# Patient Record
Sex: MALE | Race: WHITE
[De-identification: names, ages, dates, MRNs, and addresses within clinical notes are randomized per-mention and may not be internally consistent; named-entity substitution may affect disease eponyms.]

---

## 2022-02-23 ENCOUNTER — APPOINTMENT (OUTPATIENT)
Dept: FAMILY MEDICINE | Facility: CLINIC | Age: 57
End: 2022-02-23
Payer: MEDICAID

## 2022-02-23 ENCOUNTER — NON-APPOINTMENT (OUTPATIENT)
Age: 57
End: 2022-02-23

## 2022-02-23 VITALS
HEIGHT: 67 IN | HEART RATE: 80 BPM | SYSTOLIC BLOOD PRESSURE: 132 MMHG | OXYGEN SATURATION: 99 % | RESPIRATION RATE: 14 BRPM | BODY MASS INDEX: 31.23 KG/M2 | DIASTOLIC BLOOD PRESSURE: 86 MMHG | WEIGHT: 199 LBS | TEMPERATURE: 97.8 F

## 2022-02-23 VITALS — SYSTOLIC BLOOD PRESSURE: 130 MMHG | DIASTOLIC BLOOD PRESSURE: 91 MMHG

## 2022-02-23 DIAGNOSIS — Z00.00 ENCOUNTER FOR GENERAL ADULT MEDICAL EXAMINATION W/OUT ABNORMAL FINDINGS: ICD-10-CM

## 2022-02-23 DIAGNOSIS — Z83.3 FAMILY HISTORY OF DIABETES MELLITUS: ICD-10-CM

## 2022-02-23 DIAGNOSIS — Z80.7 FAMILY HISTORY OF OTHER MALIGNANT NEOPLASMS OF LYMPHOID, HEMATOPOIETIC AND RELATED TISSUES: ICD-10-CM

## 2022-02-23 DIAGNOSIS — R03.0 ELEVATED BLOOD-PRESSURE READING, W/OUT DIAGNOSIS OF HYPERTENSION: ICD-10-CM

## 2022-02-23 DIAGNOSIS — R22.2 LOCALIZED SWELLING, MASS AND LUMP, TRUNK: ICD-10-CM

## 2022-02-23 DIAGNOSIS — M79.643 PAIN IN UNSPECIFIED HAND: ICD-10-CM

## 2022-02-23 PROCEDURE — 99214 OFFICE O/P EST MOD 30 MIN: CPT | Mod: 25

## 2022-02-23 PROCEDURE — 99386 PREV VISIT NEW AGE 40-64: CPT | Mod: 25

## 2022-02-23 PROCEDURE — 93000 ELECTROCARDIOGRAM COMPLETE: CPT

## 2022-02-23 NOTE — HISTORY OF PRESENT ILLNESS
[FreeTextEntry1] : Patient is here for CPE [de-identified] : 56y M w/ no PMhx presenting to establish care/CPE.concerned w/ a skin lesion and wrist pain.\par \par wrist pain x 2 weeks. happened about 8 months ago and then the pain returned 2 weeks ago. 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 it feels swollen  he was wearing a brace he bought from [email or web address] which helps. feels better with applying ice. denies numbness or tingling of hands. denies decreased strength. gets better with rest. it improved on its own previously. he is a drummer and also plays the guitar. playing also triggers the pain. \par \par he has noticed a skin bump in his middle upper back. he has had it for 21 years. he has not noticed it getting bigger but it bothers him. not painful and doesn’t bleed.

## 2022-02-23 NOTE — HEALTH RISK ASSESSMENT
[0] : 2) Feeling down, depressed, or hopeless: Not at all (0) [PHQ-2 Negative - No further assessment needed] : PHQ-2 Negative - No further assessment needed [Current] : Current [10-14] : 10-14 [2 - 4 times a month (2 pts)] : 2-4 times a month (2 points) [1 or 2 (0 pts)] : 1 or 2 (0 points) [Never (0 pts)] : Never (0 points) [Yes] : In the past 12 months have you used drugs other than those required for medical reasons? Yes [HIV Test offered] : HIV Test offered [Hepatitis C test offered] : Hepatitis C test offered [Employed] : employed [] :  [de-identified] : started smoking at 41 yo.  he smokes a pack a day but very recently he is trying to reduce the amount.  [Audit-CScore] : 2 [de-identified] : marijuana occasionally [QTC4Iyixg] : 0 [FreeTextEntry2] : [place]

## 2022-02-23 NOTE — ASSESSMENT
[FreeTextEntry1] : Physical Exam:\par Constitutional: No acute distress, well appearing\par HEENT: Normocephalic, atraumatic\par Neck: supple\par Cardiac: S1S2, Regular rate and rhythm, No murmurs\par Pulmonary: No respiratory distress, Lungs clear to auscultation bilaterally, no wheezing, rales, or rhonchi\par Abdomen: Soft, non-tender, non-distended, no guarding, normal bowel sounds\par Vascular: No peripheral edema\par Neurology: Coordination grossly intact, no focal deficits\par Psychiatric: Alert and oriented x3, normal mood\par \par \par \par a/P:\par HCM:\par - f/u bloodwork drawn in office, will call w/ results\par - flu- refused. \par - TDAP- he thinks he got it 6 months ago. he will try to find out from old PCP\par - Colon CA screening- never had screening, given GI referral\par - PSA screening-will obtain\par \par Elevated BP:\par he eats w// a lot of salt. he drinks half a pot of coffee. does not take nsaids regularly. \par - advised to cut down on salt and coffee intake. should drink max 2 cups coffee daily\par - advised to f/u 1 month for BP check\par \par wrist/  hand pain\par uses his hands often possibly a tendinitis or cyst causing nerve compression\par - will refer to hand specialist for further eval\par \par  skin lump in upper back:\par lipoma vs. trapped hair follicle\par - will refer to surgery for further eval/ removal\par

## 2022-02-25 DIAGNOSIS — E55.9 VITAMIN D DEFICIENCY, UNSPECIFIED: ICD-10-CM

## 2022-02-25 LAB
25(OH)D3 SERPL-MCNC: 8.3 NG/ML
ALBUMIN SERPL ELPH-MCNC: 5.1 G/DL
ALP BLD-CCNC: 52 U/L
ALT SERPL-CCNC: 20 U/L
ANION GAP SERPL CALC-SCNC: 15 MMOL/L
AST SERPL-CCNC: 30 U/L
BASOPHILS # BLD AUTO: 0.09 K/UL
BASOPHILS NFR BLD AUTO: 0.9 %
BILIRUB SERPL-MCNC: 0.8 MG/DL
BUN SERPL-MCNC: 11 MG/DL
CALCIUM SERPL-MCNC: 10.3 MG/DL
CHLORIDE SERPL-SCNC: 103 MMOL/L
CHOLEST SERPL-MCNC: 184 MG/DL
CO2 SERPL-SCNC: 22 MMOL/L
CREAT SERPL-MCNC: 0.82 MG/DL
EOSINOPHIL # BLD AUTO: 0.19 K/UL
EOSINOPHIL NFR BLD AUTO: 1.9 %
ESTIMATED AVERAGE GLUCOSE: 111 MG/DL
FOLATE SERPL-MCNC: 17.1 NG/ML
GLUCOSE SERPL-MCNC: 81 MG/DL
HBA1C MFR BLD HPLC: 5.5 %
HCT VFR BLD CALC: 50 %
HCV AB SER QL: NONREACTIVE
HCV S/CO RATIO: 0.15 S/CO
HDLC SERPL-MCNC: 46 MG/DL
HGB BLD-MCNC: 16.8 G/DL
HIV1+2 AB SPEC QL IA.RAPID: NONREACTIVE
IMM GRANULOCYTES NFR BLD AUTO: 0.2 %
LDLC SERPL CALC-MCNC: 123 MG/DL
LYMPHOCYTES # BLD AUTO: 2.02 K/UL
LYMPHOCYTES NFR BLD AUTO: 19.9 %
MAN DIFF?: NORMAL
MCHC RBC-ENTMCNC: 30.7 PG
MCHC RBC-ENTMCNC: 33.6 GM/DL
MCV RBC AUTO: 91.2 FL
MONOCYTES # BLD AUTO: 0.75 K/UL
MONOCYTES NFR BLD AUTO: 7.4 %
NEUTROPHILS # BLD AUTO: 7.08 K/UL
NEUTROPHILS NFR BLD AUTO: 69.7 %
NONHDLC SERPL-MCNC: 138 MG/DL
PLATELET # BLD AUTO: 320 K/UL
POTASSIUM SERPL-SCNC: 4.5 MMOL/L
PROT SERPL-MCNC: 8.1 G/DL
PSA SERPL-MCNC: 0.82 NG/ML
RBC # BLD: 5.48 M/UL
RBC # FLD: 13.3 %
SODIUM SERPL-SCNC: 141 MMOL/L
T4 FREE SERPL-MCNC: 1.5 NG/DL
TRIGL SERPL-MCNC: 75 MG/DL
TSH SERPL-ACNC: 0.49 UIU/ML
VIT B12 SERPL-MCNC: 619 PG/ML
WBC # FLD AUTO: 10.15 K/UL

## 2022-02-25 RX ORDER — CHOLECALCIFEROL (VITAMIN D3) 1250 MCG
1.25 MG CAPSULE ORAL
Qty: 8 | Refills: 0 | Status: ACTIVE | COMMUNITY
Start: 2022-02-25 | End: 1900-01-01

## 2022-02-28 ENCOUNTER — NON-APPOINTMENT (OUTPATIENT)
Age: 57
End: 2022-02-28

## 2022-05-06 ENCOUNTER — APPOINTMENT (OUTPATIENT)
Dept: ORTHOPEDIC SURGERY | Facility: CLINIC | Age: 57
End: 2022-05-06

## 2023-07-27 ENCOUNTER — APPOINTMENT (OUTPATIENT)
Dept: INTERNAL MEDICINE | Facility: CLINIC | Age: 58
End: 2023-07-27

## 2023-07-27 NOTE — HISTORY OF PRESENT ILLNESS
[de-identified] : 57y M w/ pMHx HLD and vitamin d def\par \par last visit had elevated BP. advised to cut down salt and coffee intake. \par \par last visit had wrist and hand pain- referred to hand specialist. \par \par vitamin d def: previously sent rx for weekly suppl

## 2023-10-03 ENCOUNTER — APPOINTMENT (OUTPATIENT)
Dept: INTERNAL MEDICINE | Facility: CLINIC | Age: 58
End: 2023-10-03

## 2024-04-15 DIAGNOSIS — Z12.11 ENCOUNTER FOR SCREENING FOR MALIGNANT NEOPLASM OF COLON: ICD-10-CM

## 2025-03-27 ENCOUNTER — NON-APPOINTMENT (OUTPATIENT)
Age: 60
End: 2025-03-27

## 2025-06-23 ENCOUNTER — EMERGENCY (EMERGENCY)
Facility: HOSPITAL | Age: 60
LOS: 1 days | End: 2025-06-23
Attending: EMERGENCY MEDICINE | Admitting: EMERGENCY MEDICINE
Payer: MEDICAID

## 2025-06-23 VITALS
SYSTOLIC BLOOD PRESSURE: 140 MMHG | TEMPERATURE: 98 F | HEIGHT: 67 IN | HEART RATE: 97 BPM | RESPIRATION RATE: 19 BRPM | WEIGHT: 158.73 LBS | DIASTOLIC BLOOD PRESSURE: 100 MMHG | OXYGEN SATURATION: 98 %

## 2025-06-23 PROCEDURE — 99282 EMERGENCY DEPT VISIT SF MDM: CPT

## 2025-06-23 PROCEDURE — 99283 EMERGENCY DEPT VISIT LOW MDM: CPT

## 2025-06-23 RX ORDER — LIDOCAINE HCL/EPINEPHRINE/PF 1 %-1:200K
10 AMPUL (ML) INJECTION ONCE
Refills: 0 | Status: ACTIVE | OUTPATIENT
Start: 2025-06-23 | End: 2025-06-23

## 2025-06-23 RX ORDER — SULFAMETHOXAZOLE/TRIMETHOPRIM 800-160 MG
1 TABLET ORAL
Qty: 14 | Refills: 0
Start: 2025-06-23 | End: 2025-06-29

## 2025-06-23 RX ORDER — CEFADROXIL 500 MG/1
1 CAPSULE ORAL
Qty: 14 | Refills: 0
Start: 2025-06-23 | End: 2025-06-29

## 2025-06-23 NOTE — ED PROVIDER NOTE - CLINICAL SUMMARY MEDICAL DECISION MAKING FREE TEXT BOX
acute left upper leg abscess, without fever or scrotal involvement.  Will perform I&D, the patient has an appointment with surgery for further workup and evaluation tomorrow.

## 2025-06-23 NOTE — ED PROVIDER NOTE - NSFOLLOWUPINSTRUCTIONS_ED_ALL_ED_FT
1. Follow-up with your Primary Medical Doctor or referred doctor. Call today / next business day for prompt follow-up.  2. Return to Emergency room for any worsening or persistent pain, weakness, fever, dizziness, passing out, difficulty breathing or any other concerning symptoms.  3. See attached instruction sheets for additional information, including information regarding signs and symptoms to look out for, reasons to seek immediate care and other important instructions.  4.   Duricef twice daily for 7 days  5.  Bactrim twice daily for 7 days  6.  Follow-up with your surgeon at 7 AM tomorrow as scheduled

## 2025-06-23 NOTE — ED PROVIDER NOTE - PHYSICAL EXAMINATION
Left medial upper leg with 8 cm area of swelling and erythema consistent with an abscess with some fluctuance.  Minimal surrounding erythema.  No crepitus.  No involvement of the scrotum.

## 2025-06-23 NOTE — ED ADULT NURSE NOTE - IN ACCORDANCE WITH [place] STATE LAW, WE OFFER EVERY PATIENT A HEPATITIS C TEST. WOULD YOU LIKE TO BE TESTED TODAY?
[id number]  Assured mother signed transport paperwork, mother left the ED.     [id number]  Call to pts mother, informed EMS ETA is now [phone].   Opt out

## 2025-06-23 NOTE — ED PROVIDER NOTE - CARE PROVIDER_API CALL
Primo Duarteh  Surgery (General Surgery)  [address], [address]  [place] [address]  Phone: [phone]  Fax: [phone]  Follow Up Time:     Beatris Garcia  Internal Medicine  [address] [place] [id number]  Phone: [phone]  Fax: [phone]  Follow Up Time:

## 2025-06-23 NOTE — ED PROVIDER NOTE - PROGRESS NOTE DETAILS
After further discussion, the patient is now refusing I&D.  The patient spoke to the urgent care, via telephone.  They set him up with a surgical appointment tomorrow morning at 7 AM.  As a result, the patient does not want to have the I&D done at this time.  Discussed with him the risks, he is aware, and will follow-up tomorrow morning.  The patient will start antibiotics today, will send prescription to pharmacy. After further discussion, the patient is now refusing I&D.  The patient spoke to the urgent care, via telephone.  They set him up with a surgical appointment tomorrow morning at 7 AM.  As a result, the patient does not want to have the I&D done at this time.  Discussed with him the risks, he is aware, and will follow-up tomorrow morning.  The patient will start antibiotics today, will send prescription to pharmacy.  I was unable to convince the patient to stay to have I&D done at the bedside.  I described the procedure to him, explained to him that we will be numbing him as best as possible.  However the patient states that he cannot do it at this time, would rather see his surgeon tomorrow as scheduled.

## 2025-06-23 NOTE — ED PROVIDER NOTE - CARE PROVIDERS DIRECT ADDRESSES
,DirectAddress_Unknown,[email or web address]

## 2025-06-23 NOTE — ED PROVIDER NOTE - PATIENT PORTAL LINK FT
You can access the FollowMyHealth Patient Portal offered by [facility] by registering at the following website: [email or web address]. By joining [facility]’s FollowMyHealth portal, you will also be able to view your health information using other applications (apps) compatible with our system.

## 2025-06-23 NOTE — ED PROVIDER NOTE - OBJECTIVE STATEMENT
59-year-old male with no significant past medical history, with a history of many prior groin abscesses which needed I&D presents with has had a area of swelling and redness to the left medial upper leg over the past 2 to 3 days.  No fever or chills.  No nausea or vomiting.  No involvement of the  scrotum or penis.  No dysuria/hematuria.  No abdominal pain.  No  neck or back pain.  No aggravating or alleviating factors otherwise noted.  No other acute injury or complaints.  The patient presented to urgent care today,  but he was having difficulty allowing them to complete the I&D, so they sent him to the ED for I&D.  No aggravating or alleviating factors otherwise noted.  No other acute complaints.

## 2025-07-10 ENCOUNTER — APPOINTMENT (OUTPATIENT)
Dept: INTERNAL MEDICINE | Facility: CLINIC | Age: 60
End: 2025-07-10